# Patient Record
Sex: FEMALE | NOT HISPANIC OR LATINO
[De-identification: names, ages, dates, MRNs, and addresses within clinical notes are randomized per-mention and may not be internally consistent; named-entity substitution may affect disease eponyms.]

---

## 2017-01-17 ENCOUNTER — CHARTING TRANS (OUTPATIENT)
Dept: OTHER | Age: 47
End: 2017-01-17

## 2017-02-21 ENCOUNTER — CHARTING TRANS (OUTPATIENT)
Dept: OTHER | Age: 47
End: 2017-02-21

## 2017-02-22 ENCOUNTER — CHARTING TRANS (OUTPATIENT)
Dept: OTHER | Age: 47
End: 2017-02-22

## 2018-11-29 VITALS
SYSTOLIC BLOOD PRESSURE: 146 MMHG | HEART RATE: 80 BPM | DIASTOLIC BLOOD PRESSURE: 96 MMHG | RESPIRATION RATE: 16 BRPM | HEIGHT: 64 IN

## 2021-06-09 ENCOUNTER — APPOINTMENT (OUTPATIENT)
Dept: GENERAL RADIOLOGY | Facility: HOSPITAL | Age: 51
End: 2021-06-09
Attending: EMERGENCY MEDICINE
Payer: MEDICARE

## 2021-06-09 ENCOUNTER — HOSPITAL ENCOUNTER (EMERGENCY)
Facility: HOSPITAL | Age: 51
Discharge: HOME | End: 2021-06-09
Attending: EMERGENCY MEDICINE
Payer: MEDICARE

## 2021-06-09 VITALS
HEART RATE: 57 BPM | HEIGHT: 65 IN | RESPIRATION RATE: 15 BRPM | BODY MASS INDEX: 29.99 KG/M2 | WEIGHT: 180 LBS | OXYGEN SATURATION: 96 % | SYSTOLIC BLOOD PRESSURE: 155 MMHG | DIASTOLIC BLOOD PRESSURE: 91 MMHG

## 2021-06-09 DIAGNOSIS — R07.89 CHEST PAIN, ATYPICAL: Primary | ICD-10-CM

## 2021-06-09 PROCEDURE — 84484 ASSAY OF TROPONIN QUANT: CPT | Performed by: EMERGENCY MEDICINE

## 2021-06-09 PROCEDURE — 85025 COMPLETE CBC W/AUTO DIFF WBC: CPT | Performed by: EMERGENCY MEDICINE

## 2021-06-09 PROCEDURE — 85379 FIBRIN DEGRADATION QUANT: CPT | Performed by: EMERGENCY MEDICINE

## 2021-06-09 PROCEDURE — 36415 COLL VENOUS BLD VENIPUNCTURE: CPT

## 2021-06-09 PROCEDURE — 80053 COMPREHEN METABOLIC PANEL: CPT | Performed by: EMERGENCY MEDICINE

## 2021-06-09 PROCEDURE — 99284 EMERGENCY DEPT VISIT MOD MDM: CPT

## 2021-06-09 PROCEDURE — 71045 X-RAY EXAM CHEST 1 VIEW: CPT | Performed by: EMERGENCY MEDICINE

## 2021-06-09 PROCEDURE — 93010 ELECTROCARDIOGRAM REPORT: CPT

## 2021-06-09 PROCEDURE — 93005 ELECTROCARDIOGRAM TRACING: CPT

## 2021-06-09 RX ORDER — ASPIRIN 81 MG/1
324 TABLET, CHEWABLE ORAL ONCE
Status: COMPLETED | OUTPATIENT
Start: 2021-06-09 | End: 2021-06-09

## 2021-06-09 RX ORDER — PROPRANOLOL HYDROCHLORIDE 40 MG/1
40 TABLET ORAL 2 TIMES DAILY
COMMUNITY

## 2021-06-09 RX ORDER — NIFEDIPINE 60 MG/1
60 TABLET, FILM COATED, EXTENDED RELEASE ORAL DAILY
COMMUNITY

## 2021-06-09 RX ORDER — LOSARTAN POTASSIUM 25 MG/1
TABLET ORAL DAILY
COMMUNITY

## 2021-06-09 RX ORDER — CLOPIDOGREL BISULFATE 75 MG/1
75 TABLET ORAL DAILY
COMMUNITY

## 2021-06-09 RX ORDER — CALCIUM ACETATE 667 MG/1
1 CAPSULE ORAL
COMMUNITY

## 2021-06-09 RX ORDER — TRAMADOL HYDROCHLORIDE 50 MG/1
100 TABLET ORAL ONCE
Status: COMPLETED | OUTPATIENT
Start: 2021-06-09 | End: 2021-06-09

## 2021-06-09 RX ORDER — CELECOXIB 200 MG/1
200 CAPSULE ORAL DAILY
COMMUNITY

## 2021-06-09 RX ORDER — ATORVASTATIN CALCIUM 20 MG/1
20 TABLET, FILM COATED ORAL NIGHTLY
COMMUNITY

## 2021-06-09 NOTE — ED PROVIDER NOTES
Patient Seen in: [facility] Emergency Department      History   Patient presents with:  Chest Pain Angina    Stated Complaint: Chest Pain    HPI/Subjective:   HPI  Is a 70-year-old female presents ED with complaints of left-sided chest pain for the Musculoskeletal:         General: No deformity. Cervical back: Normal range of motion and neck supple. Skin:     General: Skin is warm and dry. Capillary Refill: Capillary refill takes less than 2 seconds.    Neurological:      Mental Status: Diagnosis: elevatd troponin    Post-operative Diagnosis: Moderate 40% stenosis mid LAD    Procedure: LEFT HEART CATH W/LV:   Bilateral selective coronary angiography  Attempted hemodynamic assessment of LAD lesion with pressure wire    Surgeon(s):  Lakisha Gold, may be related to lymph nodes versus residual thymic tissue.     Mild degenerative changes involving the spine.     IMPRESSION:     1. Visualized coronary arteries grossly patent.          MDM      Is a 53-year-old female presents ED with complaint of left-

## 2023-01-03 ENCOUNTER — IMAGING SERVICES (OUTPATIENT)
Dept: GENERAL RADIOLOGY | Age: 53
End: 2023-01-03
Attending: ORTHOPAEDIC SURGERY

## 2023-01-03 ENCOUNTER — OFFICE VISIT (OUTPATIENT)
Dept: ORTHOPEDICS | Age: 53
End: 2023-01-03

## 2023-01-03 DIAGNOSIS — M25.552 HIP PAIN, BILATERAL: ICD-10-CM

## 2023-01-03 DIAGNOSIS — S76.301A RIGHT HAMSTRING INJURY, INITIAL ENCOUNTER: Primary | ICD-10-CM

## 2023-01-03 DIAGNOSIS — M25.551 HIP PAIN, BILATERAL: ICD-10-CM

## 2023-01-03 PROCEDURE — 73521 X-RAY EXAM HIPS BI 2 VIEWS: CPT | Performed by: RADIOLOGY

## 2023-01-03 PROCEDURE — 99204 OFFICE O/P NEW MOD 45 MIN: CPT | Performed by: ORTHOPAEDIC SURGERY

## 2023-01-03 RX ORDER — NIFEDIPINE 60 MG/1
TABLET, FILM COATED, EXTENDED RELEASE ORAL
COMMUNITY
Start: 2022-12-16

## 2023-01-03 RX ORDER — ATORVASTATIN CALCIUM 20 MG/1
20 TABLET, FILM COATED ORAL DAILY
COMMUNITY

## 2023-01-03 RX ORDER — CLOPIDOGREL BISULFATE 75 MG/1
75 TABLET ORAL DAILY
COMMUNITY
Start: 2022-12-12

## 2023-01-03 RX ORDER — CELECOXIB 200 MG/1
CAPSULE ORAL
COMMUNITY
Start: 2022-12-14

## 2023-01-03 RX ORDER — BUDESONIDE AND FORMOTEROL FUMARATE DIHYDRATE 160; 4.5 UG/1; UG/1
2 AEROSOL RESPIRATORY (INHALATION) 2 TIMES DAILY
COMMUNITY
Start: 2022-10-04

## 2023-01-03 RX ORDER — CALCIUM ACETATE 667 MG/1
1 CAPSULE ORAL
COMMUNITY

## 2023-01-03 RX ORDER — AMLODIPINE BESYLATE 10 MG/1
TABLET ORAL
COMMUNITY
Start: 2022-10-03

## 2023-01-03 RX ORDER — BUPROPION HYDROCHLORIDE 150 MG/1
150 TABLET, EXTENDED RELEASE ORAL 2 TIMES DAILY
COMMUNITY
Start: 2022-12-19

## 2023-01-03 RX ORDER — PROPRANOLOL HYDROCHLORIDE 40 MG/1
40 TABLET ORAL DAILY
COMMUNITY
Start: 2022-12-14

## 2023-01-03 RX ORDER — SENNOSIDES 8.6 MG
650 CAPSULE ORAL EVERY 6 HOURS PRN
Qty: 30 TABLET | Refills: 0 | Status: SHIPPED | OUTPATIENT
Start: 2023-01-03

## 2023-01-03 RX ORDER — LOSARTAN POTASSIUM 25 MG/1
25 TABLET ORAL DAILY
COMMUNITY
Start: 2022-11-10

## 2023-01-03 RX ORDER — METHYLPREDNISOLONE 4 MG/1
TABLET ORAL
Qty: 21 TABLET | Refills: 0 | Status: SHIPPED | OUTPATIENT
Start: 2023-01-03

## 2023-01-03 RX ORDER — BUTALBITAL, ACETAMINOPHEN AND CAFFEINE 50; 325; 40 MG/1; MG/1; MG/1
1 TABLET ORAL EVERY 4 HOURS PRN
COMMUNITY
Start: 2016-12-22

## 2023-01-03 RX ORDER — ACETAMINOPHEN AND CODEINE PHOSPHATE 300; 60 MG/1; MG/1
TABLET ORAL
COMMUNITY
Start: 2022-12-15

## 2023-01-03 RX ORDER — DOXEPIN HYDROCHLORIDE 3 MG/1
TABLET ORAL
COMMUNITY
Start: 2022-12-20

## 2023-01-03 RX ORDER — PANTOPRAZOLE SODIUM 40 MG/1
TABLET, DELAYED RELEASE ORAL
COMMUNITY
Start: 2023-01-02

## 2023-01-03 RX ORDER — GALCANEZUMAB 120 MG/ML
INJECTION, SOLUTION SUBCUTANEOUS
COMMUNITY
Start: 2022-12-19

## 2023-02-16 ENCOUNTER — APPOINTMENT (OUTPATIENT)
Dept: ORTHOPEDICS | Age: 53
End: 2023-02-16

## 2023-03-09 ENCOUNTER — OFFICE VISIT (OUTPATIENT)
Dept: ORTHOPEDICS | Age: 53
End: 2023-03-09

## 2023-03-09 ENCOUNTER — IMAGING SERVICES (OUTPATIENT)
Dept: GENERAL RADIOLOGY | Age: 53
End: 2023-03-09
Attending: ORTHOPAEDIC SURGERY

## 2023-03-09 DIAGNOSIS — S76.301A RIGHT HAMSTRING INJURY, INITIAL ENCOUNTER: Primary | ICD-10-CM

## 2023-03-09 PROCEDURE — 99214 OFFICE O/P EST MOD 30 MIN: CPT | Performed by: ORTHOPAEDIC SURGERY

## 2023-04-03 ENCOUNTER — APPOINTMENT (OUTPATIENT)
Dept: SPORTS MEDICINE | Age: 53
End: 2023-04-03

## 2025-06-26 ENCOUNTER — APPOINTMENT (OUTPATIENT)
Dept: GENERAL RADIOLOGY | Facility: HOSPITAL | Age: 55
End: 2025-06-26
Attending: EMERGENCY MEDICINE
Payer: MEDICARE

## 2025-06-26 ENCOUNTER — HOSPITAL ENCOUNTER (OUTPATIENT)
Facility: HOSPITAL | Age: 55
Setting detail: OBSERVATION
Discharge: HOME | End: 2025-06-27
Attending: EMERGENCY MEDICINE | Admitting: STUDENT IN AN ORGANIZED HEALTH CARE EDUCATION/TRAINING PROGRAM
Payer: MEDICARE

## 2025-06-26 DIAGNOSIS — R07.9 CHEST PAIN OF UNCERTAIN ETIOLOGY: Primary | ICD-10-CM

## 2025-06-26 LAB
BASOPHILS # BLD AUTO: 0.02 X10(3) UL (ref 0–0.2)
BASOPHILS NFR BLD AUTO: 0.4 %
EOSINOPHIL # BLD AUTO: 0.06 X10(3) UL (ref 0–0.7)
EOSINOPHIL NFR BLD AUTO: 1.2 %
ERYTHROCYTE [DISTWIDTH] IN BLOOD BY AUTOMATED COUNT: 12.6 %
HCT VFR BLD AUTO: 36.4 % (ref 35–48)
HGB BLD-MCNC: 12.5 G/DL (ref 12–16)
IMM GRANULOCYTES # BLD AUTO: 0.01 X10(3) UL (ref 0–1)
IMM GRANULOCYTES NFR BLD: 0.2 %
LYMPHOCYTES # BLD AUTO: 2.39 X10(3) UL (ref 1–4)
LYMPHOCYTES NFR BLD AUTO: 47 %
MCH RBC QN AUTO: 31.1 PG (ref 26–34)
MCHC RBC AUTO-ENTMCNC: 34.3 G/DL (ref 31–37)
MCV RBC AUTO: 90.5 FL (ref 80–100)
MONOCYTES # BLD AUTO: 0.56 X10(3) UL (ref 0.1–1)
MONOCYTES NFR BLD AUTO: 11 %
NEUTROPHILS # BLD AUTO: 2.05 X10 (3) UL (ref 1.5–7.7)
NEUTROPHILS # BLD AUTO: 2.05 X10(3) UL (ref 1.5–7.7)
NEUTROPHILS NFR BLD AUTO: 40.2 %
PLATELET # BLD AUTO: 356 10(3)UL (ref 150–450)
RBC # BLD AUTO: 4.02 X10(6)UL (ref 3.8–5.3)
TROPONIN I SERPL HS-MCNC: 5 NG/L (ref ?–34)
WBC # BLD AUTO: 5.1 X10(3) UL (ref 4–11)

## 2025-06-26 PROCEDURE — 71045 X-RAY EXAM CHEST 1 VIEW: CPT | Performed by: EMERGENCY MEDICINE

## 2025-06-27 ENCOUNTER — APPOINTMENT (OUTPATIENT)
Dept: ULTRASOUND IMAGING | Facility: HOSPITAL | Age: 55
End: 2025-06-27
Attending: EMERGENCY MEDICINE
Payer: MEDICARE

## 2025-06-27 VITALS
RESPIRATION RATE: 16 BRPM | SYSTOLIC BLOOD PRESSURE: 136 MMHG | OXYGEN SATURATION: 99 % | TEMPERATURE: 98 F | HEIGHT: 65 IN | BODY MASS INDEX: 27.99 KG/M2 | HEART RATE: 71 BPM | WEIGHT: 168 LBS | DIASTOLIC BLOOD PRESSURE: 98 MMHG

## 2025-06-27 PROBLEM — R07.9 CHEST PAIN OF UNCERTAIN ETIOLOGY: Status: ACTIVE | Noted: 2025-06-27

## 2025-06-27 LAB
ALBUMIN SERPL-MCNC: 4.4 G/DL (ref 3.2–4.8)
ALBUMIN SERPL-MCNC: 4.5 G/DL (ref 3.2–4.8)
ALBUMIN/GLOB SERPL: 1.7 {RATIO} (ref 1–2)
ALBUMIN/GLOB SERPL: 1.8 {RATIO} (ref 1–2)
ALP LIVER SERPL-CCNC: 64 U/L (ref 41–108)
ALP LIVER SERPL-CCNC: 71 U/L (ref 41–108)
ALT SERPL-CCNC: 15 U/L (ref 10–49)
ALT SERPL-CCNC: 18 U/L (ref 10–49)
ANION GAP SERPL CALC-SCNC: 10 MMOL/L (ref 0–18)
ANION GAP SERPL CALC-SCNC: 8 MMOL/L (ref 0–18)
AST SERPL-CCNC: 18 U/L (ref ?–34)
AST SERPL-CCNC: 19 U/L (ref ?–34)
BASOPHILS # BLD AUTO: 0.02 X10(3) UL (ref 0–0.2)
BASOPHILS NFR BLD AUTO: 0.4 %
BILIRUB SERPL-MCNC: 0.3 MG/DL (ref 0.3–1.2)
BILIRUB SERPL-MCNC: 0.4 MG/DL (ref 0.3–1.2)
BUN BLD-MCNC: 9 MG/DL (ref 9–23)
BUN BLD-MCNC: <5 MG/DL (ref 9–23)
CALCIUM BLD-MCNC: 9.1 MG/DL (ref 8.7–10.6)
CALCIUM BLD-MCNC: 9.4 MG/DL (ref 8.7–10.6)
CHLORIDE SERPL-SCNC: 106 MMOL/L (ref 98–112)
CHLORIDE SERPL-SCNC: 107 MMOL/L (ref 98–112)
CO2 SERPL-SCNC: 27 MMOL/L (ref 21–32)
CO2 SERPL-SCNC: 30 MMOL/L (ref 21–32)
CREAT BLD-MCNC: 0.67 MG/DL (ref 0.55–1.02)
CREAT BLD-MCNC: 0.8 MG/DL (ref 0.55–1.02)
EGFRCR SERPLBLD CKD-EPI 2021: 104 ML/MIN/1.73M2 (ref 60–?)
EGFRCR SERPLBLD CKD-EPI 2021: 88 ML/MIN/1.73M2 (ref 60–?)
EOSINOPHIL # BLD AUTO: 0.04 X10(3) UL (ref 0–0.7)
EOSINOPHIL NFR BLD AUTO: 0.8 %
ERYTHROCYTE [DISTWIDTH] IN BLOOD BY AUTOMATED COUNT: 12.7 %
GLOBULIN PLAS-MCNC: 2.4 G/DL (ref 2–3.5)
GLOBULIN PLAS-MCNC: 2.6 G/DL (ref 2–3.5)
GLUCOSE BLD-MCNC: 88 MG/DL (ref 70–99)
GLUCOSE BLD-MCNC: 98 MG/DL (ref 70–99)
HCT VFR BLD AUTO: 35 % (ref 35–48)
HGB BLD-MCNC: 12 G/DL (ref 12–16)
IMM GRANULOCYTES # BLD AUTO: 0.01 X10(3) UL (ref 0–1)
IMM GRANULOCYTES NFR BLD: 0.2 %
LYMPHOCYTES # BLD AUTO: 1.98 X10(3) UL (ref 1–4)
LYMPHOCYTES NFR BLD AUTO: 41.2 %
MAGNESIUM SERPL-MCNC: 1.8 MG/DL (ref 1.6–2.6)
MCH RBC QN AUTO: 30.5 PG (ref 26–34)
MCHC RBC AUTO-ENTMCNC: 34.3 G/DL (ref 31–37)
MCV RBC AUTO: 88.8 FL (ref 80–100)
MONOCYTES # BLD AUTO: 0.51 X10(3) UL (ref 0.1–1)
MONOCYTES NFR BLD AUTO: 10.6 %
NEUTROPHILS # BLD AUTO: 2.25 X10 (3) UL (ref 1.5–7.7)
NEUTROPHILS # BLD AUTO: 2.25 X10(3) UL (ref 1.5–7.7)
NEUTROPHILS NFR BLD AUTO: 46.8 %
OSMOLALITY SERPL CALC.SUM OF ELEC: 297 MOSM/KG (ref 275–295)
PLATELET # BLD AUTO: 342 10(3)UL (ref 150–450)
POTASSIUM SERPL-SCNC: 3.4 MMOL/L (ref 3.5–5.1)
POTASSIUM SERPL-SCNC: 3.8 MMOL/L (ref 3.5–5.1)
PROT SERPL-MCNC: 6.8 G/DL (ref 5.7–8.2)
PROT SERPL-MCNC: 7.1 G/DL (ref 5.7–8.2)
RBC # BLD AUTO: 3.94 X10(6)UL (ref 3.8–5.3)
SODIUM SERPL-SCNC: 144 MMOL/L (ref 136–145)
SODIUM SERPL-SCNC: 144 MMOL/L (ref 136–145)
TROPONIN I SERPL HS-MCNC: 4 NG/L (ref ?–34)
TROPONIN I SERPL HS-MCNC: 5 NG/L (ref ?–34)
WBC # BLD AUTO: 4.8 X10(3) UL (ref 4–11)

## 2025-06-27 PROCEDURE — 93971 EXTREMITY STUDY: CPT | Performed by: EMERGENCY MEDICINE

## 2025-06-27 PROCEDURE — 99223 1ST HOSP IP/OBS HIGH 75: CPT | Performed by: STUDENT IN AN ORGANIZED HEALTH CARE EDUCATION/TRAINING PROGRAM

## 2025-06-27 RX ORDER — NIFEDIPINE 60 MG/1
60 TABLET, EXTENDED RELEASE ORAL DAILY
Status: DISCONTINUED | OUTPATIENT
Start: 2025-06-27 | End: 2025-06-27

## 2025-06-27 RX ORDER — CALCIUM ACETATE 667 MG/1
1 CAPSULE ORAL 2 TIMES DAILY
Status: DISCONTINUED | OUTPATIENT
Start: 2025-06-27 | End: 2025-06-27

## 2025-06-27 RX ORDER — BUTALBITAL, ACETAMINOPHEN AND CAFFEINE 50; 325; 40 MG/1; MG/1; MG/1
1 TABLET ORAL ONCE
Status: COMPLETED | OUTPATIENT
Start: 2025-06-27 | End: 2025-06-27

## 2025-06-27 RX ORDER — PROPRANOLOL HCL 20 MG
40 TABLET ORAL 2 TIMES DAILY
Status: DISCONTINUED | OUTPATIENT
Start: 2025-06-27 | End: 2025-06-27

## 2025-06-27 RX ORDER — BISACODYL 10 MG
10 SUPPOSITORY, RECTAL RECTAL
Status: DISCONTINUED | OUTPATIENT
Start: 2025-06-27 | End: 2025-06-27

## 2025-06-27 RX ORDER — SODIUM PHOSPHATE, DIBASIC AND SODIUM PHOSPHATE, MONOBASIC 7; 19 G/230ML; G/230ML
1 ENEMA RECTAL ONCE AS NEEDED
Status: DISCONTINUED | OUTPATIENT
Start: 2025-06-27 | End: 2025-06-27

## 2025-06-27 RX ORDER — PANTOPRAZOLE SODIUM 40 MG/1
40 TABLET, DELAYED RELEASE ORAL
Status: DISCONTINUED | OUTPATIENT
Start: 2025-06-27 | End: 2025-06-27

## 2025-06-27 RX ORDER — PROCHLORPERAZINE EDISYLATE 5 MG/ML
5 INJECTION INTRAMUSCULAR; INTRAVENOUS EVERY 8 HOURS PRN
Status: DISCONTINUED | OUTPATIENT
Start: 2025-06-27 | End: 2025-06-27

## 2025-06-27 RX ORDER — NITROGLYCERIN 0.4 MG/1
0.4 TABLET SUBLINGUAL EVERY 5 MIN PRN
Status: DISCONTINUED | OUTPATIENT
Start: 2025-06-27 | End: 2025-06-27

## 2025-06-27 RX ORDER — ACETAMINOPHEN 500 MG
500 TABLET ORAL EVERY 4 HOURS PRN
Status: DISCONTINUED | OUTPATIENT
Start: 2025-06-27 | End: 2025-06-27

## 2025-06-27 RX ORDER — SODIUM CHLORIDE 9 MG/ML
INJECTION, SOLUTION INTRAVENOUS CONTINUOUS
Status: DISCONTINUED | OUTPATIENT
Start: 2025-06-27 | End: 2025-06-27

## 2025-06-27 RX ORDER — PANTOPRAZOLE SODIUM 40 MG/1
40 TABLET, DELAYED RELEASE ORAL
COMMUNITY

## 2025-06-27 RX ORDER — LOSARTAN POTASSIUM 25 MG/1
25 TABLET ORAL DAILY
Status: DISCONTINUED | OUTPATIENT
Start: 2025-06-27 | End: 2025-06-27

## 2025-06-27 RX ORDER — CLOPIDOGREL BISULFATE 75 MG/1
75 TABLET ORAL DAILY
Status: DISCONTINUED | OUTPATIENT
Start: 2025-06-27 | End: 2025-06-27

## 2025-06-27 RX ORDER — BUDESONIDE AND FORMOTEROL FUMARATE DIHYDRATE 80; 4.5 UG/1; UG/1
2 AEROSOL RESPIRATORY (INHALATION) 2 TIMES DAILY
COMMUNITY

## 2025-06-27 RX ORDER — POLYETHYLENE GLYCOL 3350 17 G/17G
17 POWDER, FOR SOLUTION ORAL DAILY PRN
Status: DISCONTINUED | OUTPATIENT
Start: 2025-06-27 | End: 2025-06-27

## 2025-06-27 RX ORDER — PROPRANOLOL HCL 20 MG
40 TABLET ORAL
Status: DISCONTINUED | OUTPATIENT
Start: 2025-06-27 | End: 2025-06-27

## 2025-06-27 RX ORDER — ONDANSETRON 2 MG/ML
4 INJECTION INTRAMUSCULAR; INTRAVENOUS EVERY 6 HOURS PRN
Status: DISCONTINUED | OUTPATIENT
Start: 2025-06-27 | End: 2025-06-27

## 2025-06-27 RX ORDER — IPRATROPIUM BROMIDE AND ALBUTEROL SULFATE 2.5; .5 MG/3ML; MG/3ML
3 SOLUTION RESPIRATORY (INHALATION) EVERY 6 HOURS PRN
Status: DISCONTINUED | OUTPATIENT
Start: 2025-06-27 | End: 2025-06-27

## 2025-06-27 RX ORDER — MAGNESIUM OXIDE 400 MG/1
400 TABLET ORAL ONCE
Status: COMPLETED | OUTPATIENT
Start: 2025-06-27 | End: 2025-06-27

## 2025-06-27 RX ORDER — MECLIZINE HCL 12.5 MG 12.5 MG/1
12.5 TABLET ORAL EVERY 12 HOURS PRN
Status: DISCONTINUED | OUTPATIENT
Start: 2025-06-27 | End: 2025-06-27

## 2025-06-27 RX ORDER — ATORVASTATIN CALCIUM 20 MG/1
20 TABLET, FILM COATED ORAL NIGHTLY
Status: DISCONTINUED | OUTPATIENT
Start: 2025-06-27 | End: 2025-06-27

## 2025-06-27 RX ORDER — FLUTICASONE PROPIONATE AND SALMETEROL 100; 50 UG/1; UG/1
1 POWDER RESPIRATORY (INHALATION) 2 TIMES DAILY
Status: DISCONTINUED | OUTPATIENT
Start: 2025-06-27 | End: 2025-06-27

## 2025-06-27 RX ORDER — ENOXAPARIN SODIUM 100 MG/ML
40 INJECTION SUBCUTANEOUS DAILY
Status: DISCONTINUED | OUTPATIENT
Start: 2025-06-27 | End: 2025-06-27

## 2025-06-27 RX ORDER — DICYCLOMINE HYDROCHLORIDE 10 MG/1
20 CAPSULE ORAL 3 TIMES DAILY
Status: DISCONTINUED | OUTPATIENT
Start: 2025-06-27 | End: 2025-06-27

## 2025-06-27 RX ORDER — BENZONATATE 100 MG/1
200 CAPSULE ORAL 3 TIMES DAILY PRN
Status: DISCONTINUED | OUTPATIENT
Start: 2025-06-27 | End: 2025-06-27

## 2025-06-27 RX ORDER — CHOLECALCIFEROL (VITAMIN D3) 25 MCG
1000 TABLET ORAL DAILY
Status: DISCONTINUED | OUTPATIENT
Start: 2025-06-27 | End: 2025-06-27

## 2025-06-27 RX ORDER — ECHINACEA PURPUREA EXTRACT 125 MG
1 TABLET ORAL
Status: DISCONTINUED | OUTPATIENT
Start: 2025-06-27 | End: 2025-06-27

## 2025-06-27 RX ORDER — SENNOSIDES 8.6 MG
17.2 TABLET ORAL NIGHTLY PRN
Status: DISCONTINUED | OUTPATIENT
Start: 2025-06-27 | End: 2025-06-27

## 2025-06-27 RX ORDER — IPRATROPIUM BROMIDE AND ALBUTEROL SULFATE 2.5; .5 MG/3ML; MG/3ML
3 SOLUTION RESPIRATORY (INHALATION) EVERY 6 HOURS PRN
COMMUNITY

## 2025-06-27 RX ORDER — CELECOXIB 200 MG/1
200 CAPSULE ORAL
Status: DISCONTINUED | OUTPATIENT
Start: 2025-06-27 | End: 2025-06-27

## 2025-06-27 RX ORDER — DICYCLOMINE HYDROCHLORIDE 10 MG/1
20 CAPSULE ORAL 3 TIMES DAILY
COMMUNITY

## 2025-06-27 RX ORDER — MECLIZINE HCL 12.5 MG 12.5 MG/1
12.5 TABLET ORAL EVERY 12 HOURS PRN
COMMUNITY

## 2025-06-27 NOTE — SPIRITUAL CARE NOTE
Spiritual Care Visit Note    Patient Name: Mervat Hutchinson Date of Spiritual Care Visit: 25   : 1970 Primary Dx: Chest pain of uncertain etiology   Referred By:  ED rounds    Spiritual Care Taxonomy:    Intended Effects: Demonstrate caring and concern    Methods: Offer support    Interventions: Intention to share words of hope and inspiration    Visit Type/Summary:  Mervat appeared to be sleeping when  arrived at her ED room.  She will remain on  visit schedule.       Spiritual Care support can be requested via an Epic consult. For urgent/immediate needs, please contact the On Call  at: Edward: ext [phone]    Sangeeta Aguirre MDiv.  Staff

## 2025-06-27 NOTE — PLAN OF CARE
Assumed care of pt after shift report. Received pt A&Ox4, follows commands. Non-tele. RA. VSS. Regular diet. Contx 2. Independent. Pt complained of headache, stated it's a \"migraine,\" and requested PTA med MD Giacomo aware. Pt given Fioricet at 0829, per pt report headache improved. Bed low and locked in position, call light within reach. Plan for discharge today.

## 2025-06-27 NOTE — CM/SW NOTE
06/27/25 1200   Discharge disposition   Expected discharge disposition Home or Self   Discharge transportation Jesus     Notified by RN pt is medically cleared and will need transportation home. RN stated pt informed her she does not have Uber on her phone and her daughter cannot pick her up. SHERNO reviewed pt's Facesheet and pt has [employer] MMAI listed.     SW called [employer] MMA[employer] transport line ([phone]) and was transferred to Betty at [facility] ([phone]). Betty stated she does not see pt in the system and to contact pt's insurance plan for an alternative transportation contact number. SW reviewed pt's insurance card on EMR. Pt has [employer] Medicare Advantage PPO card, not [employer] MMAI.     SW met with pt at bedside and inquired on correct insurance for pt. SW informed pt SHANIQUE has transportation benefits through Medicaid. Pt stated the insurance listed is the one she has and was unfamiliar with an MMAI plan. SW inquired if pt's dtr is able to transport her or if she can call on Uber. Pt became upset and stated her daughter is working and she does not have Uber on her phone, stating she has her own vehicle. SW informed pt this writer is attempting to assist with transportation but since it does not appear she has an MMAI plan this writer is trying to explore all possible options. Pt became upset stating 'dont you think I would have done that' and stated she was told in the ED transportation would be provided. SW informed pt transportation is not typically provided by the hospital and a free Lyft is provided case by case. Pt then stood up attempting to leave and stated 'I don't need anything free from you and I'll just walk home.' SHERON again informed pt this writer is attempting to assist with transportation. SW informed pt this writer will contact the supervisor regarding a Lyft ride home and to remain in her hospital room until the ride is ready. SHERON informed pt if she is in [facility]  in the future and needs a ride home a Lyft may not be offered/provided again. Pt stated 'I am never returning to this hospital.' Pt confirmed the address on the Facesheet is correct.     SHERON spoke with CM supervisor who is aware Lyft ride home will be ordered for pt.     SHERON called [facility] [id number] and spoke with Petros. SHERON provided Petros with pt's destination address.    [address] [place] [id number]    Petros stated to have the RN bring pt down to the [facility] Entrance at the desk by the revolving doors. Petros stated he will order the Lyft once pt is at the [facility] entrance. Updated RN.     RUY Pina  Discharge Planner

## 2025-06-27 NOTE — ED QUICK NOTES
Writer spoke to pt - she requested a new md = not available. All labs/stress test at [facility] earlier this evening were wnl per ermd here. Pt agreed to stay and have US of left arm due to pain - ermd updated. Security remains outside pt's door.  Pt's family remain at bs. Pt reminded to keep her new iv in place in case of future meds after US completed.    US tech requested pt to go to US#3 with security.  Pt has now taken off all equipment and changed into street clothes. Er pct took pt to US 3 with security.

## 2025-06-27 NOTE — PLAN OF CARE
NURSING DISCHARGE NOTE    Discharged Home via Wheelchair.  Accompanied by Verna ELIAS to the South Entrance per Armida Cason [facility]  Belongings Taken by patient/family.      Cardiology and Hospitalist signed off. Pt verbalized understanding of the discharge instructions. RN attempting to plan transportation to home, pt unwilling to participate. Per pt report, \"daughter is working.\" Pt encouraged to call medicaid for transportation benefits per [facility] advise, pt shook her head. RN endorsed transportation needs to W, per [facility] to have pt wheeled to the South Entrance.

## 2025-06-27 NOTE — ED QUICK NOTES
Pt updated on results of US of LUE = wnl. Also, ermd spoke to cardiology and they want pt admitted tonoc due to her cardiac hx. Pt and family updated and pt agreed to stay. Iv remains in place. Pt denies any new pain and remains resting comfortably on cart in A0.

## 2025-06-27 NOTE — SPIRITUAL CARE NOTE
Spiritual Care Visit Note    Patient Name: Mervat Hutchinson Date of Spiritual Care Visit: 25   : 1970 Primary Dx: Chest pain of uncertain etiology       Referred By: Referral From:     Spiritual Care Taxonomy:    Intended Effects: Build relationship of care and support    Methods: Offer spiritual/Latter-day support    Interventions: Acknowledge current situation    Visit Type/Summary:     - Spiritual Care: Patient alert - stated she would be discharged today.  Spiritual Care service not needed.     Spiritual Care support can be requested via an Epic consult. For urgent/immediate needs, please contact the On Call  at: Edward: ext [phone]    Leela De Paz

## 2025-06-27 NOTE — PLAN OF CARE
NURSING ADMISSION NOTE      Patient admitted via Cart  Oriented to room.  Safety precautions initiated.  Bed in low position.  Call light in reach.  Admission navigator completed by this RN, updated on plan of care, patient verbalized understanding and all needs met at this time.    Assumed patient care @0330  A&O x4  VSS, SB/NSR on tele, HR 50 - 60's  On RA  Non-cardiac electrolyte replacement protocol  K replaced per protocol  R extended forearm PIV, infusing 0.9% @100mL/hr  NPO, sips with meds  Continent x2  Up standby assist  Fall precautions in place  Bed alarm on, in lowest position, call light within reach and all needs met at this time    Problem: Risk for Violence/Aggression  Goal: Absence of Violence/Aggression  Description: INTERVENTIONS:   - Identify precipitating factors for behavior   - Notify Charge RN/Provider   - Consider decreasing stimulation   - Consider distraction measures   - Consider discussion with provider regarding prn meds    - Consider MARY (Moderate Risk only)   - Consider Code Support (High Risk only)   - Consider room safety checks   - Consider restraints  Outcome: Progressing     Problem: SAFETY ADULT - FALL  Goal: Free from fall injury  Description: INTERVENTIONS:  - Assess pt frequently for physical needs  - Identify cognitive and physical deficits and behaviors that affect risk of falls.  - Falcon fall precautions as indicated by assessment.  - Educate pt/family on patient safety including physical limitations  - Instruct pt to call for assistance with activity based on assessment  - Modify environment to reduce risk of injury  - Provide assistive devices as appropriate  - Consider OT/PT consult to assist with strengthening/mobility  - Encourage toileting schedule  Outcome: Progressing     Problem: GASTROINTESTINAL - ADULT  Goal: Maintains or returns to baseline bowel function  Description: INTERVENTIONS:  - Assess bowel function  - Maintain adequate hydration with IV or PO  as ordered and tolerated  - Evaluate effectiveness of GI medications  - Encourage mobilization and activity  - Obtain nutritional consult as needed  - Establish a toileting routine/schedule  - Consider collaborating with pharmacy to review patient's medication profile  Outcome: Progressing

## 2025-06-27 NOTE — CONSULTS
[facility]  Report of Consultation    Mervat Hutchinson Patient Status:  Observation    1970 MRN [id number]   Location [facility] 3NE-A Attending Raysa Cavazos,    Hosp Day # 0 PCP EMILI VALLE     Reason for Consultation:  Arm pain / leg pain    History of Present Illness:  Mervat Hutchinson is a a(n) 55 year old female presenting with left arm pain and bilateral leg pain with tingling.  Admitted to [facility] recently for same with normal echo, normal stress.    Here, ECG and troponin are normal.    History of non-obstructive CAD on cath in .    Denies chest pain to me.    History:  Past Medical History[1]  Past Surgical History[2]  Family History[3]   reports that she has been smoking cigarettes. She has never used smokeless tobacco. She reports that she does not currently use alcohol. She reports that she does not currently use drugs.    Allergies:  Allergies[4]    Medications:  Current Hospital Medications[5]    Review of Systems:  All systems were reviewed and are negative except as described above in HPI.    Physical Exam:  Blood pressure (!) 114/93, pulse 58, temperature 98.5 °F (36.9 °C), temperature source Oral, resp. rate 18, height 5' 5\" (1.651 m), weight 168 lb (76.2 kg), SpO2 100%.  Temp (24hrs), Av.5 °F (36.9 °C), Min:98.4 °F (36.9 °C), Max:98.5 °F (36.9 °C)    Wt Readings from Last 3 Encounters:   25 168 lb (76.2 kg)   21 180 lb (81.6 kg)       Telemetry: SR  General: Alert and oriented in no apparent distress.  HEENT: No focal deficits.  Neck: No JVD, carotids 2+ no bruits.  Cardiac: Regular rate and rhythm, S1, S2 normal, no murmur, rub or gallop.  Lungs: Clear without wheezes, rales, rhonchi or dullness.  Normal excursions and effort.  Abdomen: Soft, non-tender.   Extremities: Without clubbing, cyanosis or edema.  Peripheral pulses are 2+.  Neurologic: Alert and oriented, normal affect.  Skin: Warm and dry.     Laboratories and Data:  Diagnostics:  EKG: SR,  normal      Labs:   Lab Results   Component Value Date    WBC 5.1 06/26/2025    RBC 4.02 06/26/2025    HGB 12.5 06/26/2025    HCT 36.4 06/26/2025    MCV 90.5 06/26/2025    MCH 31.1 06/26/2025    MCHC 34.3 06/26/2025    RDW 12.6 06/26/2025    .0 06/26/2025     No results found for: \"PT\", \"INR\"    Assessment/Plan:  Problem List[6]    CAD   - Symptoms are not ACS   - Follow up with primary cardiologist at [facility]   - No further testing planned    2. Arm/leg pain   - Defer to hospitalist / OP work up    3. Hyperlipidemia   - Cont statin    4. HTN   - Cont meds    Cardiology will sign off. Please call with any further questions      Chirag Syed MD  6/27/2025  6:07 AM         [1]   Past Medical History:   Asthma (HCC)    Essential hypertension   [2] History reviewed. No pertinent surgical history.  [3] History reviewed. No pertinent family history.  [4] No Known Allergies  [5]   Current Facility-Administered Medications:     nitroglycerin (Nitrostat) SL tab 0.4 mg, 0.4 mg, Sublingual, Q5 Min PRN    sodium chloride 0.9% infusion, , Intravenous, Continuous    enoxaparin (Lovenox) 40 MG/0.4ML SUBQ injection 40 mg, 40 mg, Subcutaneous, Daily    acetaminophen (Tylenol Extra Strength) tab 500 mg, 500 mg, Oral, Q4H PRN    melatonin tab 3 mg, 3 mg, Oral, Nightly PRN    polyethylene glycol (PEG 3350) (Miralax) 17 g oral packet 17 g, 17 g, Oral, Daily PRN    sennosides (Senokot) tab 17.2 mg, 17.2 mg, Oral, Nightly PRN    bisacodyl (Dulcolax) 10 MG rectal suppository 10 mg, 10 mg, Rectal, Daily PRN    fleet enema (Fleet) rectal enema 133 mL, 1 enema, Rectal, Once PRN    ondansetron (Zofran) 4 MG/2ML injection 4 mg, 4 mg, Intravenous, Q6H PRN    prochlorperazine (Compazine) 10 MG/2ML injection 5 mg, 5 mg, Intravenous, Q8H PRN    benzonatate (Tessalon) cap 200 mg, 200 mg, Oral, TID PRN    glycerin-hypromellose- (Artificial Tears) 0.2-0.2-1 % ophthalmic solution 1 drop, 1 drop, Both Eyes, QID PRN    sodium chloride  (Saline Mist) 0.65 % nasal solution 1 spray, 1 spray, Each Nare, Q3H PRN    potassium chloride 40 mEq in 250mL sodium chloride 0.9% IVPB premix, 40 mEq, Intravenous, Once    atorvastatin (Lipitor) tab 20 mg, 20 mg, Oral, Nightly    fluticasone-salmeterol (Advair Diskus) 100-50 MCG/ACT inhaler 1 puff, 1 puff, Inhalation, BID    celecoxib (CeleBREX) cap 200 mg, 200 mg, Oral, Daily with breakfast    clopidogrel (Plavix) tab 75 mg, 75 mg, Oral, Daily    dicyclomine (Bentyl) cap 20 mg, 20 mg, Oral, TID    ipratropium-albuterol (Duoneb) 0.5-2.5 (3) MG/3ML inhalation solution 3 mL, 3 mL, Nebulization, Q6H PRN    losartan (Cozaar) tab 25 mg, 25 mg, Oral, Daily    meclizine (Antivert) tab 12.5 mg, 12.5 mg, Oral, Q12H PRN    NIFEdipine ER (Procardia-XL) 24 hr tab 60 mg, 60 mg, Oral, Daily    pantoprazole (Protonix) DR tab 40 mg, 40 mg, Oral, QAM AC    cholecalciferol (Vitamin D3) tab 1,000 Units, 1,000 Units, Oral, Daily    calcium acetate (Phoslo) cap 667 mg, 1 capsule, Oral, BID    propranolol (Inderal) tab 40 mg, 40 mg, Oral, Daily Beta Blocker  [6]   Patient Active Problem List  Diagnosis    Chest pain of uncertain etiology

## 2025-06-27 NOTE — PLAN OF CARE
Mervat Hutchinson Patient Status:  Emergency    1970 MRN [id number]   Location [facility] EMERGENCY DEPARTMENT Attending Gustavo Villagomez MD   Hosp Day # 0 PCP EMILI VALLE     Cardiology Nocturnal APN Note    Briefly: (Documentation from chart review)     Mervat Hutchinson is a 55 yr old F who presents with chest pain   and has a PMH/PSH of: COPD asthma overlap syndrom HTN IBS CAD prev [facility] dz in LAD  HL recently hospitalized at [facility] from - for chest pain and had normal stress test with cardiology planning OP coronary CTA    Under imaging note  Echo [facility] NWM    EF 60% Mild -mod MR mild TR PASP 36 2025   Nuc stress [facility] 2025 Negative nuc stress  [facility] 3/2021 [facility]   mLAD 50% D2 50% EF 60-65%      Past Medical History[1]    Primary Cardiologist new    Vital Signs:       2025     1:15 AM 2025     1:30 AM   Vitals History   /99 148/99   Pulse 63 59   Resp 17 13   SpO2 100 % 100 %        Labs:   Lab Results   Component Value Date    WBC 5.1 2025    HGB 12.5 2025    HCT 36.4 2025    .0 2025    CREATSERUM 0.80 2025    BUN 9 2025     2025    K 3.4 2025     2025    CO2 30.0 2025    GLU 98 2025    CA 9.4 2025    ALB 4.5 2025    ALKPHO 71 2025    BILT 0.3 2025    TP 7.1 2025    AST 18 2025    ALT 18 2025    TROPHS 5 2025       Diagnostics:   XR CHEST AP PORTABLE  (CPT=71045)  Result Date: 2025  PROCEDURE: XR CHEST AP PORTABLE  (CPT=71045) INDICATIONS: swelling, pain, chest tightness. Seen at [facility] PATIENT STATED HISTORY: CP all day. COMPARISON: 2021 TECHNIQUE:  2 views FINDINGS: CARDIAC/ VASC: Heart size and pulmonary vascularity are normal. MEDIASTINUM/IAN: No mass or enlarged adenopathy. LUNGS/PLEURA: Normal.  No consolidation or pleural effusion. BONES:  No suspect bone lesion or acute fracture. OTHER: Negative.     CONCLUSION:  Normal exam. Electronically Verified and Signed by Attending Radiologist: Gustavo Cardoso MD 6/26/2025 11:43 PM Workstation: [id number]      Allergies:  Allergies[2]    Medications:  Current Hospital Medications[3]    Assessment:   EKG shows sr with TWI III and V1 which is no change from 6/2021 EKG  CxR  Normal exam  Trop 5   K 3.4 creat 0.80   WBC 5.1 HH 12.5/36.4 plts 356  /90 HR 69 RR 18 O2 sats 98% RA  In ED:  IV  ml/hr   Klor con po given in ED   Testing at [facility] and [facility] summarized above details under imaging note        Plan:  Troponins trend and obtain EKG if indicated  Consider echo in am (not ordered)   Cbc cmp mag in am   - Continue to monitor overnight  - Formal Cardiology consult to follow in AM.       Mariaa Gay NP  [facility]  6/27/2025  3:17 AM         [1]   Past Medical History:   Asthma (HCC)    Essential hypertension   [2] No Known Allergies  [3] No current facility-administered medications for this encounter.

## 2025-06-27 NOTE — ED PROVIDER NOTES
Patient Seen in: [facility] Emergency Department        History  Chief Complaint   Patient presents with    Pain    Chest Pain Angina     Stated Complaint: swelling, pain, chest tightness. Seen at [facility]    Subjective:   HPI            Patient is a 55-year-old female presents to ED for evaluation of chest pain.  Patient states has been having daily episodes of chest pain for years multiple times a day which last for seconds.  Cannot quantify how many seconds but just a few seconds per patient.  Describes it substernal left-sided.  Sometimes with left arm pain.  History of hypertension.  Patient denies any history of stents in her heart.  Patient states she has chronic shortness of breath from COPD.  Patient recently admitted to hospital 6/20 through 6/23.  Underwent echocardiogram showing mild to moderate mitral regurgitation and mild tricuspid regurgitation per epic record review.  She underwent nuclear stress test 6/21/2025 that was negative for inducible ischemia.  Small mild, fixed apical inferior perfusion defect likely due to artifact.  They recommended that she get an outpatient coronary CT.  She went back into [facility] today because she had pain in her left arm where the IV was and thought she had a blood clot there.  She is a former smoker      Objective:     Past Medical History:    Asthma (HCC)    Essential hypertension              History reviewed. No pertinent surgical history.             Social History     Socioeconomic History    Marital status: Single   Tobacco Use    Smoking status: Every Day     Current packs/day: 0.50     Types: Cigarettes    Smokeless tobacco: Never   Substance and Sexual Activity    Alcohol use: Not Currently    Drug use: Not Currently     Social Drivers of Health     Food Insecurity: No Food Insecurity (6/27/2025)    NCSS - Food Insecurity     Worried About Running Out of Food in the Last Year: No     Ran Out of Food in the Last Year: No   Transportation  Needs: No Transportation Needs (6/27/2025)    NCSS - Transportation     Lack of Transportation: No   Housing Stability: Not At Risk (6/27/2025)    NCSS - Housing/Utilities     Has Housing: Yes     Worried About Losing Housing: No     Unable to Get Utilities: No                                Physical Exam    ED Triage Vitals   BP 06/26/25 2227 138/90   Pulse 06/26/25 2227 69   Resp 06/26/25 2227 18   Temp 06/26/25 2227 98.4 °F (36.9 °C)   Temp src 06/27/25 0328 Oral   SpO2 06/26/25 2227 98 %   O2 Device 06/26/25 2227 None (Room air)       Current Vitals:   Vital Signs  BP: (!) 114/93  Pulse: 58  Resp: 18  Temp: 98.5 °F (36.9 °C)  Temp src: Oral  MAP (mmHg): (!) 107    Oxygen Therapy  SpO2: 100 %  O2 Device: None (Room air)  O2 Flow Rate (L/min): 0 L/min  Pulse Oximetry Type: Continuous  Oximetry Probe Site Changed: No  Pulse Ox Probe Location: Left hand            Physical Exam  GENERAL: No acute distress, well appearing and non-toxic, Alert and oriented X 3   HEENT: Normocephalic, atraumatic.  Moist mucous membranes.  Pupils equal round reactive to light and accommodation, extraocular motion is intact, sclerae white, conjunctiva is pink.  Oropharynx is unremarkable, no exudate.  NECK: Supple, trachea midline, no lymphadenopathy.   LUNG: Lungs clear to auscultation bilaterally, no wheezing, no rales, no rhonchi.  CARDIOVASCULAR: Regular rate and rhythm.  Normal S1S2.  No S3S4 or murmur.  ABDOMEN: Bowel sounds are present. Soft. nondistended, no pulsatile masses. nontender  MUSCULOSKELETAL: No calf tenderness.  Dorsalis and Posterior Tibial pulses present. No clubbing. No cyanosis.  No edema.   SKIN EXAMINATIoN: Warm and dry with normal appearance.  No rashes or lesions.  Patient has a hematoma left mid forearm with a palpable cord there  NEUROLOGICAL:  Motor strength intact all groups.  normal sensation, speech intact        ED Course  Labs Reviewed   COMP METABOLIC PANEL (14) - Abnormal; Notable for the following  components:       Result Value    Potassium 3.4 (*)     Calculated Osmolality 297 (*)     All other components within normal limits   TROPONIN I HIGH SENSITIVITY - Normal   CBC WITH DIFFERENTIAL WITH PLATELET   TROPONIN I HIGH SENSITIVITY   TROPONIN I HIGH SENSITIVITY   CBC WITH DIFFERENTIAL WITH PLATELET   COMP METABOLIC PANEL (14)   MAGNESIUM   RAINBOW DRAW LAVENDER   RAINBOW DRAW LIGHT GREEN   RAINBOW DRAW BLUE   C. DIFFICILE(TOXIGENIC)PCR   Potassium 3.4  EKG    Rate, intervals and axes as noted on EKG Report.  Rate: 65  Rhythm: Sinus Rhythm  Reading: No acute changes              I personally reviewed xray films of chest and independent interpretation shows no evidence for pneumonia.  I also reviewed formal xray report as read by radiology with findings below:    XR CHEST AP PORTABLE  (CPT=71045)  Result Date: 6/26/2025  PROCEDURE: XR CHEST AP PORTABLE  (CPT=71045) INDICATIONS: swelling, pain, chest tightness. Seen at [facility] PATIENT STATED HISTORY: CP all day. COMPARISON: 6/9/2021 TECHNIQUE:  2 views FINDINGS: CARDIAC/ VASC: Heart size and pulmonary vascularity are normal. MEDIASTINUM/INA: No mass or enlarged adenopathy. LUNGS/PLEURA: Normal.  No consolidation or pleural effusion. BONES:  No suspect bone lesion or acute fracture. OTHER: Negative.     CONCLUSION: Normal exam. Electronically Verified and Signed by Attending Radiologist: Gustavo Cardoso MD 6/26/2025 11:43 PM Workstation: [id number]    Venous ultrasound left upper extremity read by [facility] radiology negative for DVT                    MDM     Patient is a 55-year-old female presents to ED for evaluation of chest pain for years.  Differential angina, costochondritis.  Patient underwent cardiopulmonary workup.  Normal EKG.  Normal troponin.  Potassium 3.4.  Chest x-ray normal.  Concerned about blood clot to left upper extremity and ultrasound negative for DVT.  Reviewed prior record and concerning thing is that she had 50% mid LAD lesion  on angiogram 4 years ago.  Patient symptoms do not sound anginal but spoke with [facility] cardiovascular nurse practitioner and they stated we could admit her and trend her troponins.  Case discussed with hospitalist.  Please note that patient when I walked out of the room after obtaining initial HPI referred to me a \"fucking asshole\" when speaking with the nurse that was placing her IV.  Nurse told me how she had referred to me and I went back into the room and confronted the patient on how we are trying to help her out and I did not appreciate her referring to myself in this manner.  I was very calm when I initially walked back into the room and was discussing this incident with the patient.  Patient got very upset with me and started yelling at me and we had a heated discussion as she was yelling at me and security came into the room to settle patient down.    Admission disposition: 6/27/2025 12:54 AM       External and old record review was performed.  I reviewed reviewed echocardiogram and nuclear stress test through [facility] performed a few days ago with details listed in the HPI.  Able to review prior records from outside facility and she had a normal coronary CT in 2021.  Despite normal coronary CT, cardiology decided perform angiogram because of persistent chest pain and a 50% mid LAD lesion was noted on coronary angiography March 2021.  Reviewed cardiology office note from 6/19/2025 where they recommended outpatient coronary CT and EGD        Medical Decision Making      Disposition and Plan     Clinical Impression:  1. Chest pain of uncertain etiology         Disposition:  Admit  6/27/2025 12:54 am    Follow-up:  No follow-up provider specified.        Medications Prescribed:  Current Discharge Medication List                Supplementary Documentation:         Hospital Problems       Present on Admission           ICD-10-CM Noted POA    * (Principal) Chest pain of uncertain etiology R07.9 6/27/2025  Unknown

## 2025-06-27 NOTE — IMAGING NOTE
[facility]  6/22/25  Echo    PATIENT:   Name: ALEX PERLA   MRN: [id number]   YOB: 1970 Age: 55 years   Gender: F   Location: Inpatient or Observation   Primary rhythm: sinus.   Height: 165.10 cm BSA: 1.87 m²   Weight: 76.10 kg  BMI: 27.9 kg/m²     Primary rhythm: sinus.     Heart rate     61 bpm   Blood pressure 123/66 mmHg   Study quality: fair.     MEASUREMENTS:                           Value              Indexed Value   Sinus of Valsalva        2.4 cm   Left atrium diameter     4.4 cm (2D)   Left atrial volume       81.6 ml.           43.7 ml/m².   LV ID (diastole)         4.9 cm (2D)        2.6 cm/m²   LV ID (systole)          2.7 cm (2D)        1.5 cm/m²   IVS, leaflet tips        1.0 cm (2D)   Posterior wall thickness 1.1 cm (2D)   LV stroke volume         52 ml (2D biplane)   LVOT diam                2.0 cm   LVOT stroke volume       58 ml              31.8 ml/m²   LV end diastolic volume                     45.8 ml/m²   LV end systolic volume   34 ml (2D biplane) 18.1 ml/m²   Ejection Fraction        60 % (2D biplane)   RV basal diameter        3.1 cm   TAPSE                    26.6 mm   S'                       20.0     Doppler:                              Value   AV Peak Velocity            1.4 m/s   AV Peak Gradient            8 mmHg   AV Mean Gradient            4 mmHg   AV Velocity Time Integral   28.8 cm   LVOT Peak Velocity          1.0 m/s   LVOT Peak Gradient          4 mmHg   LVOT Velocity Time Integral 18.6 cm   AV Area Cont Eq VTI         2.0 cm²   AV Area Cont Eq peak        2.3 cm²   MV Peak Velocity            0.9 m/s   MV Peak Gradient            3 mmHg   MV Mean Gradient            1 mmHg   MV Area PHT                 2.68 cm²   Mitral E Point Velocity     0.6 m/s   Mitral A Point Velocity     0.8 m/s   TR Peak Velocity            2.6 m/s   TR Peak Gradient            27.9 mmHg   PV Peak Velocity            1.0 m/s   PV Peak Gradient            4 mmHg   RVOT Peak  Velocity          0.8 m/s     FINDINGS:     LEFT VENTRICLE:   The left ventricle is normal in size.   There is mild concentric left ventricular hypertrophy.   Left ventricular systolic function is normal. EF = 60% (2D biplane)   Left ventricular diastolic function is consistent with abnormal relaxation (stage I). Left Atrial pressures are normal. Mitral annular lateral e': 5.0 cm/s. Mitral annular lateral E/e': 11.8. Mitral annular septal e': 4.7 cm/s. Mitral annular septal E/e': 12.6. The average Mitral E/e' ratio is 12.2.     LV Wall Motion:   All scored segments are normal.       RIGHT VENTRICLE:   The right ventricle is normal in size. Right ventricular systolic function is normal.   The estimated right ventricular systolic pressure is 36 mmHg. The right atrial pressure is 8 mmHg. Finding is consistent with mild pulmonary hypertension.     LEFT ATRIUM:   The left atrial size is moderately enlarged. The LA volume is 81.6 ml, 43.7 ml/m² when indexed.     RIGHT ATRIUM:   The right atrial cavity is normal in size.     MITRAL VALVE:   There is mild mitral annular calcification. The annulus measured 3.6 cm. There is mild mitral valve leaflet thickening. There is no mitral stenosis. The mitral valve mean gradient is 1 mmHg at a heart rate of 61 bpm. There is mild to moderate mitral valve regurgitation.     TRICUSPID VALVE:   The tricuspid valve leaflets are structurally normal. There is no tricuspid stenosis. There is mild tricuspid valve regurgitation.     AORTIC VALVE:   The aortic valve leaflets are structurally normal. The aortic valve is tricuspid. There is no aortic valve stenosis. There is trivial aortic valve regurgitation.     PULMONIC VALVE   The pulmonic valve is not well visualized, but grossly normal. There is no pulmonic stenosis. There is trivial pulmonic valve regurgitation.     AORTA:   The visualized aorta is normal in size.   Measurements - Sinus 2.4 cm. Sinotubular junction 2.4 cm. Proximal  ascending aorta 3.3 cm.   PERICARDIUM:   There is no pericardial effusion.     CONCLUSIONS:   -Two-dimensional transthoracic echocardiography was performed using standard views & projections with M-mode and Doppler (continuous, pulsed wave, spectral & color flow).   -The left ventricle is normal in size. There is mild concentric left ventricular hypertrophy. Left ventricular systolic function is normal. EF = 60% (2D biplane) Left ventricular diastolic function is consistent with abnormal relaxation (stage I).   -The right ventricle is normal in size. Right ventricular systolic function is normal. The estimated right ventricular systolic pressure is 36 mmHg. The right atrial pressure is 8 mmHg. Finding is consistent with mild pulmonary hypertension.   -The left atrial size is moderately enlarged.   -There is mild to moderate mitral valve regurgitation.   -There is mild tricuspid valve regurgitation.   -The visualized aorta is normal in size.     -Compared to prior echo performed on 4/7/2023 mild increase in mitral regurgitation.     Nuc Stress    [facility] CARDIOLOGY        [address] [place] [address]          [phone]    --------------------------------------------------------------------------------  Nuclear Cardiology Report    --------------------------------------------------------------------------------  Name:          ALEX PERLA  Date of Study:  6/21/2025  MRN:           [id number]   Patient Height: 165.10  YOB: 1970      Patient Weight: 169.75  AGE:           55 years       Accession #     [id number]  Gender:        F    Technologist: Luis Antonio Nogueira  Nurse: Natalie Richardson  Requesting Physician: ED JOE  Reading Physician:  Diagnosing Physician: [id number] Evans Pond    PROTOCOL: NUC MED STRESS VASODILATOR    INDICATIONS: Exam performed for evaluation of chest pain.    HISTORY:  Current Medications: Albuterol, Amlodipine, Symbicort, Plavix, Lovenox,  Hydrochlorothiazide, Losartan, Propranolol, Rosuvastatin.    RISK FACTORS: Hypertension, High Cholesterol, Known CAD, Asthma, COPD, Seizures.    PROCEDURE: Pharm stress protocol was followed due to the patient's inability to exercise. The patient was given a .5mL (0.4 mg regadenoson) dose of Lexiscan intravenously over 10 seconds. The resting heart rate was 70 beats per minute. The age predicted target heart rate was 142 bpm. Resting EKG showed normal sinus rhythm at a rate of 70 beats per minute, with nonspecific ST-T wave changes. There was ST-T abnormalities during stress. The patient developed shortness of breath and developed a headache during the test.  Images were obtained using RestTc-99m/Stress Tc-99m 1 day protocol. This study was acquired using Gated SPECT.  The patient was given 9.6 mCi of Tc-99m sestamibi IV at rest on 06/21/2025. Approximately 30 minutes after the injection, the patient underwent SPECT imaging with the patient in the supine position. At 30 seconds after the infusion, 29.7 mCi of Tc-99m sestamibi was injected on 06/21/2025. The stress images were obtained approxiamately 30 minutes post tracer injection with the patient in the supine position. The data was reconstructed in the short, horizontal long, and vertical long axis views and tomographic slices were generated. CT Attenuation correction was performed. The CT images are not diagnostic and used only for the purposes of attenuation correction. There is artifact due to superimposed bowel.    FINDINGS:  The overall study imaging quality was deemed to be good.  Ejection fraction is 69%  The left ventricular dilation at stress was found to be normal.  Wall Motion: All segments are normal.  Perfusion: Stress  The apical inferior segment shows mild decreased activity. All remaining scored  segments are normal.  Perfusion: Rest  The apical inferior segment shows mild decreased activity. All remaining scored  segments are normal.    Stage    HR Blood Pressure  Resting 70 156/103    Stage     HR  Rythym BP      O2 Sa  Stress    90  SR     185/104  98  1 minute  Stress    100        163/107  98  2 minutes  Stress    94  ST     159/107  100  3 minutes  Stress        SR  Post      87         158/106  100  1 minute  Post      85  SR     163/100  100  2 minutes  Post      83  SR     153/97   99  3 minutes  Post          SR  IMPRESSIONS:  EKG response is normal.  Ejection fraction is 69%  The TID is 1.04  Study is negative for inducible ischemia. There is a small, mild, predominantly fixed apical inferior perfusion defect, which is likely due to artifact given normal wall motion/thickening. Of note, superimposed bowel reduces specificity of this study.  Compared to the prior study from 4/7/2023, the current study reveals apical inferior perfusion defect present, but previous study had more bowel overlapping this area, which may \"masked\" this.    D/w Dr. Machuca  Electronically signed by Dr.Nimit Pond on 6/21/2025 at 11:55:32 AM.    ** Final **  Procedure Note    Evans Pond MD - 06/21/2025  Formatting of this note might be different from the original.     [facility] CARDIOLOGY        [address] [place] [id number]          [phone]    --------------------------------------------------------------------------------  Nuclear Cardiology Report    --------------------------------------------------------------------------------  Name:          ALEX PERLA  Date of Study:  6/21/2025  MRN:           [id number]   Patient Height: 165.10  YOB: 1970      Patient Weight: 169.75  AGE:           55 years       Accession #     [id number]  Gender:        F    Technologist: Luis Antonio Nogueira  Nurse: Natalie Richardson  Requesting Physician: ED JOE  Reading Physician:  Diagnosing Physician: [id number] [facility]    PROTOCOL: NUC MED STRESS VASODILATOR    INDICATIONS: Exam performed for evaluation of chest  pain.    HISTORY:  Current Medications: Albuterol, Amlodipine, Symbicort, Plavix, Lovenox, Hydrochlorothiazide, Losartan, Propranolol, Rosuvastatin.    RISK FACTORS: Hypertension, High Cholesterol, Known CAD, Asthma, COPD, Seizures.    PROCEDURE: Pharm stress protocol was followed due to the patient's inability to exercise. The patient was given a .5mL (0.4 mg regadenoson) dose of Lexiscan intravenously over 10 seconds. The resting heart rate was 70 beats per minute. The age predicted target heart rate was 142 bpm. Resting EKG showed normal sinus rhythm at a rate of 70 beats per minute, with nonspecific ST-T wave changes. There was ST-T abnormalities during stress. The patient developed shortness of breath and developed a headache during the test.  Images were obtained using RestTc-99m/Stress Tc-99m 1 day protocol. This study was acquired using Gated SPECT.  The patient was given 9.6 mCi of Tc-99m sestamibi IV at rest on 06/21/2025. Approximately 30 minutes after the injection, the patient underwent SPECT imaging with the patient in the supine position. At 30 seconds after the infusion, 29.7 mCi of Tc-99m sestamibi was injected on 06/21/2025. The stress images were obtained approxiamately 30 minutes post tracer injection with the patient in the supine position. The data was reconstructed in the short, horizontal long, and vertical long axis views and tomographic slices were generated. CT Attenuation correction was performed. The CT images are not diagnostic and used only for the purposes of attenuation correction. There is artifact due to superimposed bowel.    FINDINGS:  The overall study imaging quality was deemed to be good.  Ejection fraction is 69%  The left ventricular dilation at stress was found to be normal.  Wall Motion: All segments are normal.  Perfusion: Stress  The apical inferior segment shows mild decreased activity. All remaining scored  segments are normal.  Perfusion: Rest  The apical inferior  segment shows mild decreased activity. All remaining scored  segments are normal.    Stage   HR Blood Pressure  Resting 70 156/103    Stage     HR  Rythym BP      O2 Sa  Stress    90  SR     185/104  98  1 minute  Stress    100        163/107  98  2 minutes  Stress    94  ST     159/107  100  3 minutes  Stress        SR  Post      87         158/106  100  1 minute  Post      85  SR     163/100  100  2 minutes  Post      83  SR     153/97   99  3 minutes  Post          SR  IMPRESSIONS:  EKG response is normal.  Ejection fraction is 69%  The TID is 1.04  Study is negative for inducible ischemia. There is a small, mild, predominantly fixed apical inferior perfusion defect, which is likely due to artifact given normal wall motion/thickening. Of note, superimposed bowel reduces specificity of this study.  Compared to the prior study from 2023, the current study reveals apical inferior perfusion defect present, but previous study had more bowel overlapping this area, which may \"masked\" this.    D/w Dr. Machuca    [facility] 3/31/2021    INVASIVE CARDIAC PROCEDURE    NAME: Mervat Hutchinson GENDER: F                  DATE: 2021  :  1970     HT/WT:  165.1 cm 83.5 kg   FIN#: [id number]  AGE:  51             BSA:    1.98 m^2           LOC:  9700  MRN:  [id number]    CIRCULATOR:     Margarito Fuentes  RECORDING TECH: Isaiah Hui  SCRUB TECH:     Leela Macias Karolyn    --------------------------------------------------------------------------------  Procedures performed:    - Left coronary angiography.  - Right coronary angiography.  - Left ventriculography.    --------------------------------------------------------------------------------  SUMMARY:    1. Hemodynamics: End diastolic pressure in the left ventricle is     normal.  2. Mitral valve: The valve was evaluated by fluoroscopy. There is     no significant regurgitation.  3. Left ventricle: Systolic function is normal. The estimated     ejection  fraction is 60-65%.  4. Aortic valve: The valve was evaluated by hemodynamic     measurement. There is no stenosis.  5. LAD: Mid-vessel lesion: There is a 50% stenosis.    IMPRESSIONS:  The patient was recently admitted to hospital with atypical chest  pain and troponin was elevated at 2. At that time patient had CT angiography  that showed no significant occlusive disease. The patient also has had a stress  test in February this year for atypical chest pain that showed no perfusion  abnormalities.  Due to repeated admissions with atypical chest pain decision was made to proceed  with coronary angiography that showed an intermediate lesion in distal left  anterior descending coronary artery. An attempt was made to perform hemodynamic  measurement on this lesion however due to tortuosity of LAD the pressure wire  could not be advanced across the lesion.  As the patient has atypical symptoms, normal recent stress test a decision was  made to continue medical management for an intermediate lesion in LAD that will  include dual antiplatelet therapy long-acting nitrates, beta-blocker and statin.    --------------------------------------------------------------------------------  INDICATIONS:   Chest Pain.    --------------------------------------------------------------------------------  LABS, PRIOR TESTS, PROCEDURES, and SURGERY:  Serum creatinine (current admission) of 0.8 mg/dl.  Prothrombin time (PT) of  11.1 sec.  Partial thromboplastin time (PTT) of 26.5 sec.  Hematocrit of 37.3%.  Serum sodium (Na) of 141 mM.  Serum potassium (K) of 4.2 mM.  Glucose of 95  mg/dl.  Blood urea nitrogen of 19 mg/dl.  Hemoglobin (pre-procedure) of 12.7  g/dl.  International normalized ratio (INR) of 1.  Troponin I (pre-procedure) of  0.22 ng/ml.    --------------------------------------------------------------------------------  PROCEDURE IN DETAIL:   Consent:  Consent for diagnostic catheterization,  potential percutaneous coronary  intervention, and possible coronary bypass  surgery was obtained. The risks and benefits of all these procedures were  explained at great length. Risks including but not limited to heart attack,  stroke, death, bleeding, infection, allergic reaction, arrhythmia requiring  treatment, renal insufficiency requiring dialysis, cholesterol emboli syndrome,  dissection, perforation, branch occlusion, vascular access site complications,  need for blood transfusion or emergent vascular or cardiac surgery were  explained. Risks and benefits of drug eluting stent versus metal stent placement  were discussed. There are no contraindications to long-term Plavix therapy.  Alternatives were discussed. Questions were answered. Patient understood, was  agreeable, and wished to proceed and the consent was signed and witnessed. The  patient was brought to the cath lab and placed on the table. The planned  puncture sites were prepped and draped in the usual sterile fashion. Cardiac  catheterization performed electively.  Radiation exposure:  Fluoroscopy time:  22.2 min. Total time: 22.2 min. Fluoroscopy dose: 68.6 cGy. Total radiation  dose: 68.6 cGy.     Location:  Catheterization laboratory.  PROCEDURE:    1.  Initial setup. The patient was brought to the laboratory in a      fasting state. A baseline ECG was recorded. Surface ECG leads,      automatic cuff blood pressure measurements, pulse oximetric      signals, and end-tidal CO2 tracings were monitored.  2.  Skin preparation. The planned puncture sites were prepped and      draped in the usual sterile manner.  3.  Supplemental oxygen. Oxygen, 2 L/min was administered      throughout the procedure.  4.  Immediate re-assessment for sedation completed by MD/      (pre-procedure). Moderate sedation was administered under the      supervision of performing MD. Sedation commenced at 03/30/2021      12:34 PM and concluded at 03/30/2021 01:37 PM for a total      duration of 62.95  minute(s).  5.  Local anesthesia. Lidocaine (3 ml) was administered to the to      the right wrist.  6.  Right radial artery access. A 6Fr Glidesheath Slender sheath      was advanced into the vessel.  7.  Selective left coronary angiography. A 6 Fr x 100cm TIG 4.0      catheter was introduced. Contrast was injected. Images were      obtained using multiple projections.  8.  The catheter was successfully exchanged for a 6Fr x 100cm JR4      catheter.  9.  Selective right coronary angiography. A 6Fr x 100cm JR4      catheter was introduced. Contrast was injected. Images were      obtained using multiple projections.  10. The catheter was successfully exchanged for a 6FR X 110CM PIG      145 catheter.  11. Left heart angiography. A 6FR X 110CM  catheter was      introduced. With the catheter in the left ventricle, 35 ml of      contrast was injected using a hand-operated, power-assisted      injector at 12 ml/s.  12. ACT was 312 sec.  13. Unsuccessful attempt to achieve right radial artery hemostasis.      Vessel closure was achieved with a Regular Vasc Band device.      Mechanical compression was applied. Hemostasis was not obtained      on this attempt.    STUDY COMPLETION:  The patient tolerated the procedure well. There were no  complications.  Administered medications:   IV fluids , 0 ml , infusion.  NITROGLYCERIN (IA) , 200 mcg.  VERAPAMIL (IA) , 2.5 mg.  HEPARIN (IA) , 2,500  units.  Heparin , 2,000 units.  Midazolam , for a total dose of 5 mg.  Fentanyl  , for a total dose of 100 mcg.  Contrast:   Isovue-370 240 ml (total dose).    --------------------------------------------------------------------------------    --------------------------------------------------------------------------------  Findings    Coronary arteries:   The coronary circulation is right dominant. The left main  trifurcates into the LAD, a ramus intermedius, and the left circumflex.  Left main: Normal-sized vessel that bifurcates  into LAD and circumflex arteries  and is free of significant occlusive disease.  LAD:  Mid-vessel lesion: There is a 50% stenosis.  There is GURJIT grade 3 flow  (brisk flow) across the lesion.   An attempt was made to cross the lesion with  pressure wire that was not successful due to extreme takeoff from left main as  well as branch points and tortuosity of distal LAD. Left anterior descending  coronary artery is a large vessel that wraps around left ventricular apex, gives  2 small diagonal branches and a large septal . There is a long tubular  50% lesion after origin of second diagonal branch with irregular margins but no  haziness.  Left circumflex: Circumflex artery is a large nondominant vessel that gives  obtuse marginal branches and a small posterolateral branch. There is minor  luminal irregularity without significant occlusive disease.  Right coronary: Right coronary artery is a medium size dominant vessel that is  free of significant occlusive disease along with its posterior descending branch  as well as a small posterolateral branch.  Left ventricle:  Systolic function is normal. The estimated ejection fraction is  60-65%.  Aortic valve:    The valve was evaluated by hemodynamic measurement.  There is  no stenosis.  Mitral valve:  The valve was evaluated by fluoroscopy. There is no significant  regurgitation.    --------------------------------------------------------------------------------  Hemodynamics:  Hemodynamics:  End diastolic pressure in the left ventricle is normal.  Circulatory function:    +-----------------------+------------------------------+  Stage description      Condition 1 -                   +-----------------------+------------------------------+  LV pressure s/d, ed    156/12, 29, dP/ye=1217 mm Hg/s  +-----------------------+------------------------------+  Aortic pressure s/d (m)150/88 (114)                     +-----------------------+------------------------------+    Aortic valve:    +----------------------+------------------+--------------------+  Stage description     Condition 1 - FickCondition 1 - Thermo  +----------------------+------------------+--------------------+  Mean gradient         10.4 mm Hg        10.4 mm Hg            +----------------------+------------------+--------------------+  Peak-peak gradient    6 mm Hg           6 mm Hg               +----------------------+------------------+--------------------+  Systolic ejection [id number] ms            302 ms                +----------------------+------------------+--------------------+    Prepared and electronically signed by    Bronson Suresh MD  [id number]  Procedure Note    Bronson Suresh MD - 03/31/2021  Formatting of this note might be different from the original.  [address] [address]  Phone: [phone]    --------------------------------------------------------------------------------  INVASIVE CARDIAC PROCEDURE

## 2025-06-27 NOTE — ED QUICK NOTES
Orders for admission, patient is aware of plan and ready to go upstairs. Any questions, please call ED RN Blanca at extension [facility].     Patient Covid vaccination status: Fully vaccinated     COVID Test Ordered in ED: None    COVID Suspicion at Admission: N/A    Running Infusions:  None    Mental Status/LOC at time of transport: a/ox4    Other pertinent information:   CIWA score: N/A   NIH score:  N/A

## 2025-06-27 NOTE — ED QUICK NOTES
Pt ambulated to  -states cp all day 6/10- EKG per triage on chart. Updated on poc and received a gown. Two young family members at bs.    Iv access attempted x two - port US now at bs for US RN.

## 2025-06-27 NOTE — H&P
[facility]     Mervat Hutchinson Patient Status:  Emergency    1970 MRN [id number]   Location [facility] EMERGENCY DEPARTMENT Attending Gustavo Villagomez MD   Hosp Day # 0 PCP EMILI VALLE     Chief Complaint: arm/leg pain    Subjective:    History of Present Illness:     Mervat Hutchinson is a 55 year old female with PMHx asthma COPD overlap syndrome / HTN/ IBS/ CAD/ HLD who presented to the hospital for arm and leg pain. She reports having arm and leg pain for a while. It is a tearing pain in her left arm down into her hand and bilateral legs. It radiates into her chest at times and is rated 10/10 with no alleviating or exacerbating factors. She gets tingling in her feet as well. She denied any dyspnea, lightheadedness, palpitations, nausea, diaphoresis.     She was just hospitalized at [facility] from - for the same and had a normal stress test with cardiology planning out-patient coronary CTA.    History/Other:    Past Medical History:  Past Medical History[1]  Past Surgical History:   Past Surgical History[2]   Family History:   Family History[3]  Social History:    reports that she has been smoking cigarettes. She has never used smokeless tobacco. She reports that she does not currently use alcohol. She reports that she does not currently use drugs.     Allergies: Allergies[4]    Medications:  Medications Ordered Prior to Encounter[5]    Review of Systems:   A comprehensive review of systems was completed.    Pertinent positives and negatives noted in the HPI.    Objective:   Physical Exam:    BP (!) 148/99   Pulse 63   Temp 98.4 °F (36.9 °C)   Resp 17   Ht 5' 5\" (1.651 m)   Wt 168 lb (76.2 kg)   SpO2 100%   BMI 27.96 kg/m²   General: No acute distress, Alert  Respiratory: No rhonchi, no wheezes  Cardiovascular: S1, S2. Regular rate and rhythm  Abdomen: Soft, Non-tender, non-distended, positive bowel sounds  Neuro: No new focal deficits  Extremities: No  edema      Results:    Labs:      Labs Last 24 Hours:    Recent Labs   Lab 06/26/25 2334   RBC 4.02   HGB 12.5   HCT 36.4   MCV 90.5   MCH 31.1   MCHC 34.3   RDW 12.6   NEPRELIM 2.05   WBC 5.1   .0       Recent Labs   Lab 06/26/25 2334   GLU 98   BUN 9   CREATSERUM 0.80   EGFRCR 88   CA 9.4   ALB 4.5      K 3.4*      CO2 30.0   ALKPHO 71   AST 18   ALT 18   BILT 0.3   TP 7.1       Estimated Glomerular Filtration Rate: 88 mL/min/1.73m2 (result from lab).    No results found for: \"PT\", \"INR\"    Recent Labs   Lab 06/26/25 2334   TROPHS 5       No results for input(s): \"TROP\", \"PBNP\" in the last 168 hours.    No results for input(s): \"PCT\" in the last 168 hours.    Imaging: Imaging data reviewed in Epic.    Assessment & Plan:      #Atypical chest pain  -chest xray without acute process  -EKG showing NSR @65 bpm, t wave inversions aVR/ V1  -negative stress test 6/23 @[facility]  -hx cath March 2021: 50% mid LAD stenosis, 50% long tubular lesion after origin of second diagonal branch  -cont to trend troponin until peak  -monitor on telemetry  -nitroglycerin prn  -cardiology c/s in ED: wants admitted for poss coronary CT vs cath  -cont home plavix    #hypokalemia  -K 3.4  -replete  -cont to monitor Bmp  -check mg level    #HLD  -cont home statin    #IBS  -cont home dicyclomine    #asthma COPD overlap  -cont home inhalers    #HTN  -cont home losartan, nifedipine, propanolol    #GERD  -cont home PPI        Plan of care discussed with ED physician    Mary Granados DO    Supplementary Documentation:     The 21st Century Cures Act makes medical notes like these available to patients in the interest of transparency. Please be advised this is a medical document. Medical documents are intended to carry relevant information, facts as evident, and the clinical opinion of the practitioner. The medical note is intended as peer to peer communication and may appear blunt or direct. It is written in medical  language and may contain abbreviations or verbiage that are unfamiliar.                                       [1]   Past Medical History:   Asthma (HCC)    Essential hypertension   [2] History reviewed. No pertinent surgical history.  [3] No family history on file.  [4] No Known Allergies  [5]   No current facility-administered medications on file prior to encounter.     Current Outpatient Medications on File Prior to Encounter   Medication Sig Dispense Refill    celecoxib 200 MG Oral Cap Take 200 mg by mouth daily.      Propranolol HCl 40 MG Oral Tab Take 40 mg by mouth 2 (two) times daily.      NIFEdipine ER 60 MG Oral Tablet 24 Hr Take 60 mg by mouth daily.      Losartan Potassium 25 MG Oral Tab Take by mouth daily.      calcium acetate 667 MG Oral Cap Take 1 capsule by mouth 3 (three) times daily with meals.      Clopidogrel Bisulfate 75 MG Oral Tab Take 75 mg by mouth daily.      atorvastatin 20 MG Oral Tab Take 20 mg by mouth nightly.      Vitamin D3, Cholecalciferol, 1000 UNITS Oral Cap Take 1,000 Units by mouth daily.
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
## 2025-06-28 LAB
ATRIAL RATE: 65 BPM
P AXIS: 52 DEGREES
P-R INTERVAL: 146 MS
Q-T INTERVAL: 434 MS
QRS DURATION: 86 MS
QTC CALCULATION (BEZET): 451 MS
R AXIS: 24 DEGREES
T AXIS: 21 DEGREES
VENTRICULAR RATE: 65 BPM

## 2025-06-30 ENCOUNTER — PATIENT OUTREACH (OUTPATIENT)
Dept: CASE MANAGEMENT | Age: 55
End: 2025-06-30

## 2025-06-30 NOTE — PROGRESS NOTES
PCP / Specialist appointment request (discharged 06/27)    Dr Ella Temple  Internal Medicine  [facility]  [address]  [address]  [place] [address] [phone]    Dr Mariaa Smith  Cardiology  [address]  [place] [address] [phone]  Patient has existing appointment Wed 07/02 @11:00am    Attempt #1:  Unable to contact patient (phone rang several times then busy signal)

## 2025-07-01 NOTE — PROGRESS NOTES
Hospital follow up.    Dr. Ella Temple MD  Internal Medicine  [address] [place] [address]  [phone]    Mariaa Smith MD  Cardiology  [address] [place] [address] [address]  [place], [place] [id number] [phone]  Wednesday 7/2 @1  Existing appointment.    Attempt #2:  Call rang until busy, unable to leave a message.

## 2025-07-02 NOTE — PROGRESS NOTES
Hospital follow up.    Dr. Ella Temple MD  Internal Medicine  [address] [place] [address]  [phone]    Mariaa Smith MD  Cardiology  [address] [place] [address] [address]  [place], [place] [id number] [phone]  Wednesday 7/2 @1  Existing appointment.    Attempt #3:  Call rang until busy, unable to leave a message. Closing encounter. Will re-open if patient returns call.